# Patient Record
Sex: MALE | Race: WHITE | URBAN - METROPOLITAN AREA
[De-identification: names, ages, dates, MRNs, and addresses within clinical notes are randomized per-mention and may not be internally consistent; named-entity substitution may affect disease eponyms.]

---

## 2017-02-23 ENCOUNTER — OFFICE VISIT (OUTPATIENT)
Dept: SURGERY | Facility: CLINIC | Age: 32
End: 2017-02-23
Payer: OTHER MISCELLANEOUS

## 2017-02-23 VITALS
WEIGHT: 210 LBS | DIASTOLIC BLOOD PRESSURE: 92 MMHG | BODY MASS INDEX: 31.83 KG/M2 | SYSTOLIC BLOOD PRESSURE: 147 MMHG | HEIGHT: 68 IN | HEART RATE: 77 BPM

## 2017-02-23 DIAGNOSIS — R10.32 LEFT GROIN PAIN: Primary | ICD-10-CM

## 2017-02-23 PROCEDURE — 99203 OFFICE O/P NEW LOW 30 MIN: CPT | Performed by: SURGERY

## 2017-02-23 NOTE — LETTER
[facility]  [address]  [facility] [address]  [phone]          February 23, 2017    RE:  Benoit Zabala                                                                                                                                                       [address] [facility] [address]            To whom it may concern:    Benoit J Sagrario is under my professional care and was seen in our office from 11:30-12.45 today.         Sincerely,        Kishor Broderick MD

## 2017-02-23 NOTE — LETTER
"  2017    [facility]  Surgery Consultation    RE: Benoit Zabala-:  [birth year]     CONSULTATION REQUESTED BY: Breanna Ma [phone]     HPI: Patient is a 31-year-old gentleman who is a referral from primary care regarding consultation for possible left-sided sports hernia. He states that on  he was lifting parts at work and rotating when he felt Immediate pain within his left groin. This has been persistent since and has gotten somewhat worse. He tried physical therapy with no substantial improvement. He feels that it in fact got worse with physical therapy. He has no specific pain at rest. Pain is present with activity. He has no pain on coughing or sneezing or abdominal engagement. He has some degree of bilateral Testicular discomfort. He was evaluated at An orthopedic clinic involving imaging. Imaging suggested some inflammatory changes around the pubis. There was no other identifiable abnormality.     PMH:  has no past medical history on file.  PSH:  has no past surgical history on file.  Social History:  reports that he quit smoking about 2 years ago. He does not have any smokeless tobacco history on file. He reports that he drinks alcohol. He reports that he does not use illicit drugs.  Family History: family history includes Hyperlipidemia in his mother; Hypertension in his father.  Medications/Allergies: Home medications and allergies reviewed.     ROS: The 10 point Review of Systems is negative other than noted in the HPI.     Physical Exam:  BP (!) 147/92 Pulse 77 Ht 5' 8\" (1.727 m) Wt 210 lb (95.3 kg) BMI 31.93 kg/m2  GENERAL: Generally appears well.  Psych: Alert and Oriented. Normal affect  Eyes: Sclera clear  Respiratory: Lungs clear to ausculation bilaterally with good air excursion  Cardiovascular: Regular Rate and Rhythm with no murmurs gallops or rubs, normal peripheral pulses  GI: Abdomen Non Distended Non-Tender No hernias palpated..  Groin- " I examined the patient in both the standing and supine positions. Right Groin- No hernia Palpated. No tenderness on palpation. Left Groin- No hernia Palpated. No tenderness on palpation. No scrotal or testicle abnormalities. Patient's area of indicated discomfort appears to be below the inguinal ligament and does not involve the lower abdomen or inguinal areas.  Lymphatic/Hematologic/Immune: No femoral or cervical lymphadenopathy.  Integumentary: No rashes  Neurological: grossly intact      All new lab and imaging data was reviewed.      Impression and Plan:  Patient is a 31 year old male with Left groin pain of unclear etiology     PLAN: Symptoms do not appear to be consistent with sports hernia. His pain is not located in the lower abdominal musculature. Although he does have some MRI findings suggesting osteitis pubis or athletic pubalgia his symptoms do not support or suggest the need for general surgical intervention. I recommended sports medicine evaluation and referral has been given. I will await their recommendations.     Thank you very much for this consult.     Kishor Broderick M.D.  [facility]  [phone]

## 2017-02-23 NOTE — LETTER
[address], [address]  [place] [address]  [phone]  F: [phone]    [address], [address]  [place] [address]  [phone]  F: [phone]    [email or web address]  [email or web address]  [email or web address]         DATE:  2017         To whom it may concern,       This is to certify that Benoit Zabala (: 1985) has been under my care for an office visit on nd was seen 11:30-1:00. If you have any further questions please call [phone].              Sincerely,               Kishor Broderick MD

## 2017-02-23 NOTE — MR AVS SNAPSHOT
"              After Visit Summary   2017    Benoit Zabala    MRN: [id number]           Patient Information     Date Of Birth          1985        Visit Information        Provider Department      2017 11:30 AM Kishor Broderick MD [facility]       Follow-ups after your visit        Who to contact     If you have questions or need follow up information about today's clinic visit or your schedule please contact [facility] directly at [phone].  Normal or non-critical lab and imaging results will be communicated to you by [employer], letter or phone within 4 business days after the clinic has received the results. If you do not hear from us within 7 days, please contact the clinic through [employer] or phone. If you have a critical or abnormal lab result, we will notify you by phone as soon as possible.  Submit refill requests through [employer] or call your pharmacy and they will forward the refill request to us. Please allow 3 business days for your refill to be completed.          Additional Information About Your Visit        MyChart Information     [employer] lets you send messages to your doctor, view your test results, renew your prescriptions, schedule appointments and more. To sign up, go to [email or web address] . Click on \"Log in\" on the left side of the screen, which will take you to the Welcome page. Then click on \"Sign up Now\" on the right side of the page.     You will be asked to enter the access code listed below, as well as some personal information. Please follow the directions to create your username and password.     Your access code is: [email or web address]  Expires: 2017 12:40 PM     Your access code will  in 90 days. If you need help or a new code, please call your [facility] or [phone].        Care EveryWhere ID     This is your Care EveryWhere ID. This could be used by other organizations " "to access your [facility] medical records  [id number]        Your Vitals Were     Pulse Height BMI (Body Mass Index)             77 5' 8\" (1.727 m) 31.93 kg/m2          Blood Pressure from Last 3 Encounters:   02/23/17 (!) 147/92    Weight from Last 3 Encounters:   02/23/17 210 lb (95.3 kg)              Today, you had the following     No orders found for display       Primary Care Provider Office Phone # Fax #    Breanna Ma [phone]       [facility] [address] [place] [address]        Thank you!     Thank you for choosing [facility]  for your care. Our goal is always to provide you with excellent care. Hearing back from our patients is one way we can continue to improve our services. Please take a few minutes to complete the written survey that you may receive in the mail after your visit with us. Thank you!             Your Updated Medication List - Protect others around you: Learn how to safely use, store and throw away your medicines at www.disposemymeds.org.          This list is accurate as of: 2/23/17 12:40 PM.  Always use your most recent med list.                   Brand Name Dispense Instructions for use    SUMATRIPTAN SUCCINATE PO      Take by mouth every 8 hours as needed for migraine Reported on 2/23/2017       TOPIRAMATE PO            "

## 2017-02-26 NOTE — PROGRESS NOTES
"[facility]  Surgery Consultation    CONSULTATION REQUESTED BY:  Ricardobj Breanna ULLOA [phone]    HPI: Patient is a 31-year-old gentleman who is a referral from primary care regarding consultation for possible left-sided sports hernia.  He states that on December 19 he was lifting parts at work and rotating when he felt  Immediate pain within his left groin.  This has been persistent since and has gotten somewhat worse.  He tried physical therapy with no substantial improvement.   He feels that it in fact got worse with physical therapy.  He has no specific pain at rest.  Pain is present with activity.  He has no pain on coughing or sneezing or abdominal engagement.  He has some degree of bilateral  Testicular discomfort.  He was evaluated at  An orthopedic clinic involving imaging.  Imaging suggested some inflammatory changes around the pubis.  There was no other identifiable abnormality.    PMH:   has no past medical history on file.  PSH:    has no past surgical history on file.  Social History:   reports that he quit smoking about 2 years ago. He does not have any smokeless tobacco history on file. He reports that he drinks alcohol. He reports that he does not use illicit drugs.  Family History:  family history includes Hyperlipidemia in his mother; Hypertension in his father.  Medications/Allergies: Home medications and allergies reviewed.    ROS:  The 10 point Review of Systems is negative other than noted in the HPI.    Physical Exam:  BP (!) 147/92  Pulse 77  Ht 5' 8\" (1.727 m)  Wt 210 lb (95.3 kg)  BMI 31.93 kg/m2  GENERAL: Generally appears well.  Psych: Alert and Oriented.  Normal affect  Eyes: Sclera clear  Respiratory:  Lungs clear to ausculation bilaterally with good air excursion  Cardiovascular:  Regular Rate and Rhythm with no murmurs gallops or rubs, normal peripheral pulses  GI: Abdomen Non Distended Non-Tender  No hernias palpated..  Groin- I examined the patient in both the " standing and supine positions. Right Groin- No hernia Palpated.  No tenderness on palpation. Left Groin- No hernia Palpated.  No tenderness on palpation. No scrotal or testicle abnormalities. Patient's area of indicated discomfort appears to be below the inguinal ligament and does not involve the lower abdomen or inguinal areas.  Lymphatic/Hematologic/Immune:  No femoral or cervical lymphadenopathy.  Integumentary:  No rashes  Neurological: grossly intact     All new lab and imaging data was reviewed.     Impression and Plan:  Patient is a 31 year old male with Left groin pain of unclear etiology    PLAN: Symptoms do not appear to be consistent with sports hernia.  His pain is not located in the lower abdominal musculature.  Although he does have some MRI findings suggesting osteitis pubis or athletic pubalgia his symptoms do not support or suggest the need for general surgical intervention.  I recommended sports medicine evaluation and referral has been given.  I will await their recommendations.    Thank you very much for this consult.    Kishor Broderick M.D.  [facility]  [phone]    Please route or send letter to:  Primary Care Provider (PCP) and Referring Provider

## 2017-03-01 ENCOUNTER — TELEPHONE (OUTPATIENT)
Dept: SURGERY | Facility: CLINIC | Age: 32
End: 2017-03-01

## 2017-03-01 NOTE — TELEPHONE ENCOUNTER
Name of caller: Sakshi, Occupational Health Nurse for [employer]    Reason for Call:  Several Questions regarding patient's visit to Dr. Broderick    Surgeon:  Dr. Broderick     Recent Surgery:  No    If yes, when & what type:  N/A      Best phone number to reach pt at is: [phone]  Ok to leave a message with medical info? Yes.    Pharmacy preferred (if calling for a refill): na